# Patient Record
Sex: MALE | NOT HISPANIC OR LATINO
[De-identification: names, ages, dates, MRNs, and addresses within clinical notes are randomized per-mention and may not be internally consistent; named-entity substitution may affect disease eponyms.]

---

## 2017-05-02 ENCOUNTER — APPOINTMENT (OUTPATIENT)
Dept: INTERNAL MEDICINE | Facility: CLINIC | Age: 47
End: 2017-05-02

## 2017-05-02 VITALS
DIASTOLIC BLOOD PRESSURE: 70 MMHG | WEIGHT: 145 LBS | OXYGEN SATURATION: 98 % | HEART RATE: 71 BPM | SYSTOLIC BLOOD PRESSURE: 110 MMHG | TEMPERATURE: 98 F | HEIGHT: 67 IN | BODY MASS INDEX: 22.76 KG/M2

## 2017-05-02 DIAGNOSIS — Z78.9 OTHER SPECIFIED HEALTH STATUS: ICD-10-CM

## 2017-05-02 DIAGNOSIS — Z80.2 FAMILY HISTORY OF MALIGNANT NEOPLASM OF OTHER RESPIRATORY AND INTRATHORACIC ORGANS: ICD-10-CM

## 2017-05-03 LAB
ALBUMIN SERPL ELPH-MCNC: 4.5 G/DL
ALP BLD-CCNC: 56 U/L
ALT SERPL-CCNC: 14 U/L
ANION GAP SERPL CALC-SCNC: 15 MMOL/L
AST SERPL-CCNC: 24 U/L
BASOPHILS # BLD AUTO: 0.04 K/UL
BASOPHILS NFR BLD AUTO: 0.8 %
BILIRUB SERPL-MCNC: 0.8 MG/DL
BUN SERPL-MCNC: 15 MG/DL
CALCIUM SERPL-MCNC: 8.8 MG/DL
CHLORIDE SERPL-SCNC: 102 MMOL/L
CHOLEST SERPL-MCNC: 241 MG/DL
CHOLEST/HDLC SERPL: 4 RATIO
CO2 SERPL-SCNC: 24 MMOL/L
CREAT SERPL-MCNC: 0.92 MG/DL
EOSINOPHIL # BLD AUTO: 0.15 K/UL
EOSINOPHIL NFR BLD AUTO: 2.9 %
GLUCOSE SERPL-MCNC: 92 MG/DL
HCT VFR BLD CALC: 45.3 %
HDLC SERPL-MCNC: 61 MG/DL
HGB BLD-MCNC: 15.4 G/DL
IMM GRANULOCYTES NFR BLD AUTO: 0 %
LDLC SERPL CALC-MCNC: 159 MG/DL
LYMPHOCYTES # BLD AUTO: 1.13 K/UL
LYMPHOCYTES NFR BLD AUTO: 21.6 %
MAN DIFF?: NORMAL
MCHC RBC-ENTMCNC: 28.7 PG
MCHC RBC-ENTMCNC: 34 GM/DL
MCV RBC AUTO: 84.5 FL
MONOCYTES # BLD AUTO: 0.27 K/UL
MONOCYTES NFR BLD AUTO: 5.2 %
NEUTROPHILS # BLD AUTO: 3.65 K/UL
NEUTROPHILS NFR BLD AUTO: 69.5 %
PLATELET # BLD AUTO: 281 K/UL
POTASSIUM SERPL-SCNC: 4.3 MMOL/L
PROT SERPL-MCNC: 7.4 G/DL
RBC # BLD: 5.36 M/UL
RBC # FLD: 13.3 %
SODIUM SERPL-SCNC: 141 MMOL/L
TRIGL SERPL-MCNC: 104 MG/DL
WBC # FLD AUTO: 5.24 K/UL

## 2018-06-04 ENCOUNTER — TRANSCRIPTION ENCOUNTER (OUTPATIENT)
Age: 48
End: 2018-06-04

## 2018-06-05 ENCOUNTER — APPOINTMENT (OUTPATIENT)
Dept: INTERNAL MEDICINE | Facility: CLINIC | Age: 48
End: 2018-06-05
Payer: COMMERCIAL

## 2018-06-05 ENCOUNTER — LABORATORY RESULT (OUTPATIENT)
Age: 48
End: 2018-06-05

## 2018-06-05 VITALS
SYSTOLIC BLOOD PRESSURE: 122 MMHG | WEIGHT: 150 LBS | DIASTOLIC BLOOD PRESSURE: 78 MMHG | BODY MASS INDEX: 23.49 KG/M2 | OXYGEN SATURATION: 100 % | TEMPERATURE: 98.5 F | HEART RATE: 65 BPM

## 2018-06-05 DIAGNOSIS — M77.11 LATERAL EPICONDYLITIS, RIGHT ELBOW: ICD-10-CM

## 2018-06-05 PROCEDURE — 36415 COLL VENOUS BLD VENIPUNCTURE: CPT

## 2018-06-05 PROCEDURE — 99396 PREV VISIT EST AGE 40-64: CPT | Mod: 25,GC

## 2018-07-28 PROBLEM — Z78.9 ALCOHOL USE: Status: ACTIVE | Noted: 2017-05-02

## 2018-08-27 ENCOUNTER — APPOINTMENT (OUTPATIENT)
Dept: INTERNAL MEDICINE | Facility: CLINIC | Age: 48
End: 2018-08-27

## 2018-11-14 ENCOUNTER — APPOINTMENT (OUTPATIENT)
Dept: INTERNAL MEDICINE | Facility: CLINIC | Age: 48
End: 2018-11-14
Payer: COMMERCIAL

## 2018-11-14 VITALS
HEART RATE: 70 BPM | SYSTOLIC BLOOD PRESSURE: 151 MMHG | BODY MASS INDEX: 23.54 KG/M2 | WEIGHT: 150 LBS | DIASTOLIC BLOOD PRESSURE: 84 MMHG | HEIGHT: 67 IN | TEMPERATURE: 98.6 F | OXYGEN SATURATION: 99 %

## 2018-11-14 DIAGNOSIS — K21.9 GASTRO-ESOPHAGEAL REFLUX DISEASE W/OUT ESOPHAGITIS: ICD-10-CM

## 2018-11-14 PROCEDURE — 99214 OFFICE O/P EST MOD 30 MIN: CPT | Mod: GC

## 2018-11-17 ENCOUNTER — OUTPATIENT (OUTPATIENT)
Dept: OUTPATIENT SERVICES | Facility: HOSPITAL | Age: 48
LOS: 1 days | End: 2018-11-17
Payer: COMMERCIAL

## 2018-11-17 PROCEDURE — 73630 X-RAY EXAM OF FOOT: CPT

## 2018-11-17 PROCEDURE — 73630 X-RAY EXAM OF FOOT: CPT | Mod: 26,LT

## 2018-12-11 ENCOUNTER — RX RENEWAL (OUTPATIENT)
Age: 48
End: 2018-12-11

## 2019-05-30 ENCOUNTER — TRANSCRIPTION ENCOUNTER (OUTPATIENT)
Age: 49
End: 2019-05-30

## 2019-06-17 ENCOUNTER — APPOINTMENT (OUTPATIENT)
Dept: INTERNAL MEDICINE | Facility: CLINIC | Age: 49
End: 2019-06-17
Payer: COMMERCIAL

## 2019-06-17 VITALS
OXYGEN SATURATION: 98 % | HEIGHT: 67 IN | HEART RATE: 86 BPM | SYSTOLIC BLOOD PRESSURE: 125 MMHG | DIASTOLIC BLOOD PRESSURE: 70 MMHG | BODY MASS INDEX: 23.54 KG/M2 | WEIGHT: 150 LBS | TEMPERATURE: 98.9 F

## 2019-06-17 PROCEDURE — 99214 OFFICE O/P EST MOD 30 MIN: CPT | Mod: GC

## 2019-06-17 RX ORDER — NAPROXEN 500 MG/1
500 TABLET ORAL
Qty: 10 | Refills: 0 | Status: DISCONTINUED | COMMUNITY
Start: 2018-11-14 | End: 2019-06-17

## 2019-06-17 RX ORDER — MOMETASONE FUROATE 1 MG/G
0.1 CREAM TOPICAL TWICE DAILY
Qty: 1 | Refills: 1 | Status: DISCONTINUED | COMMUNITY
Start: 2017-05-02 | End: 2019-06-17

## 2019-06-17 NOTE — END OF VISIT
[] : Resident [FreeTextEntry3] :  49 year old M presenting with dizziness. Reports an episode of significant disequilibrium/dizziness less than a week ago while playing tennis. Recently had started finasteride for alopecia, purchased online. Pt notes that he had drank alcohol prior to his tennis match and that it was a hot day. Symptoms lasted ~15min and resolved spontaneously, no recent CP, palpitations or focal weakness/numbness. He has had several brief, mild episodes of dizziness since that episode, has discontinued finasteride. He has been able to exert himself normally, in fact played another tennis match and did not experience dizziness. Has a distant h/o dizziness treated empirically w/ antiemetics in the past. On exam, well appearing, well developed male NAD. Suspect medication side effect (finasteride causes orthostatic hypotension in up to 10% of patients, likely exacerbated by dehydration in the setting of EtOH intake, hot weather). Pt will return in 1-2 weeks to reassess symptoms, if not resolved at that time, will pursue further workup.

## 2019-06-17 NOTE — PHYSICAL EXAM
[No Acute Distress] : no acute distress [Well Nourished] : well nourished [Well-Appearing] : well-appearing [Normal Sclera/Conjunctiva] : normal sclera/conjunctiva [Well Developed] : well developed [PERRL] : pupils equal round and reactive to light [EOMI] : extraocular movements intact [Normal Oropharynx] : the oropharynx was normal [Normal Outer Ear/Nose] : the outer ears and nose were normal in appearance [No JVD] : no jugular venous distention [No Lymphadenopathy] : no lymphadenopathy [Supple] : supple [Thyroid Normal, No Nodules] : the thyroid was normal and there were no nodules present [No Respiratory Distress] : no respiratory distress  [Clear to Auscultation] : lungs were clear to auscultation bilaterally [No Accessory Muscle Use] : no accessory muscle use [Normal Rate] : normal rate  [Normal S1, S2] : normal S1 and S2 [Regular Rhythm] : with a regular rhythm [No Murmur] : no murmur heard [No Carotid Bruits] : no carotid bruits [No Abdominal Bruit] : a ~M bruit was not heard ~T in the abdomen [No Varicosities] : no varicosities [Pedal Pulses Present] : the pedal pulses are present [No Extremity Clubbing/Cyanosis] : no extremity clubbing/cyanosis [No Edema] : there was no peripheral edema [Soft] : abdomen soft [No Palpable Aorta] : no palpable aorta [Non Tender] : non-tender [Non-distended] : non-distended [No Masses] : no abdominal mass palpated [No HSM] : no HSM [Normal Bowel Sounds] : normal bowel sounds [Normal Posterior Cervical Nodes] : no posterior cervical lymphadenopathy [No CVA Tenderness] : no CVA  tenderness [Normal Anterior Cervical Nodes] : no anterior cervical lymphadenopathy [No Joint Swelling] : no joint swelling [No Spinal Tenderness] : no spinal tenderness [Grossly Normal Strength/Tone] : grossly normal strength/tone [No Rash] : no rash [Coordination Grossly Intact] : coordination grossly intact [Normal Gait] : normal gait [Normal Affect] : the affect was normal [Deep Tendon Reflexes (DTR)] : deep tendon reflexes were 2+ and symmetric [No Focal Deficits] : no focal deficits [Normal Insight/Judgement] : insight and judgment were intact [de-identified] : Excess cerumen in ear canals b/l

## 2019-06-17 NOTE — ASSESSMENT
[FreeTextEntry1] : 48 y/o M PMHx GERD, alopecia, Hx of vertigo presenting to [facility] with complaints of dizziness approximately 1 week ago.\par \par #Dizziness\par Patient notes persistent lightheadedness for the last week, 5 days prior to start of symptoms patient started himself on Finasteride for alopecia which he discontinued the day his symptoms started. Neuro and cardiac exam WNL. No known prior cardiac history however endorses FMHx of MI in brother (age 55) and father (age 65). Also with prior history of vertigo. Has been attempting modified Epley maneuver at home with some improvement in severity of sxs.  DDx includes medication induced orthostatic hypotension from Finasteride (reported in <10% of cases) vs vertigo vs dehydration. Cardiac and neurological etiology lower on ddx based on physical exam and history.\par -Encouraged to continue to do modified Epley maneuver at home\par -Encouraged adequate hydration especially with exercise\par -If sxs persistent, will consider further workup\par -Recommended that he return to the clinic in 1-2 weeks to re-assess his symptoms \par \par #Back \par Patient states that approximately 4 weeks ago, he experienced some intermittent R lower back pain exacerbated by certain movements. Straight leg raise test negative on exam. Likely muscle spasms\par -Patient active, encouraged adequate stretching with exercise\par -Advised that patient may take Ibuprofen PRN with food for pain \par \par #Hypercholesterolemia\par Patient with elevated total cholesterol, not on statin therapy, low ASCVD score\par -Would like to defer statin therapy and continue with lifestyle modifications\par -Will re-check lipid profile at next visit in 1-2 weeks\par \par #Excessive cerumen\par Excess cerumen in external ear canal b/l\par -Rx for Debrox sent to pharmacy\par \par #Health Care Maintenance\par -HIV - deferred \par -Tdap - 5/2017\par \par RTC in 1-2 weeks to re-assess dizziness

## 2019-06-17 NOTE — HISTORY OF PRESENT ILLNESS
[FreeTextEntry1] : dizzy [de-identified] : 50 y/o M PMHx GERD, alopecia, Hx of vertigo presenting to [facility] with complaints of dizziness approximately 1 week ago. Patient states that during his 2 hr tennis match, he felt very lightheaded. He states that the day he was playing was hot (in the 80s) however he states that he was used to playing for that long and in similar weather. Patient notes that earlier in the day he had a full lunch and had two alcoholic beverages during that time. He wasn't sure if that may have contributed to his symptoms later on that day. He describes the dizziness as having the sensation of feeling unsteady and notes leaning to his R side, lasted approximately 15 minutes. He notes similar, less severe episodes daily after the first episode. Of note, patient notes that approximately 2 weeks ago, he started Finasteride for his alopecia, which he states obtaining through an online service. Since these dizzy spells, he discontinued this medication. In addition, patient notes R lower back pain for the last 3-4 weeks which is exacerbated with certain movements. Believes he may have sciatica. Otherwise patient denies any fevers, chills, NS, associated room spinning, chest pain, palpitations, shortness of breath, nausea/vomiting, numbness, weakness. Remainder of ROS negative.

## 2019-06-23 ENCOUNTER — RESULT CHARGE (OUTPATIENT)
Age: 49
End: 2019-06-23

## 2019-06-24 ENCOUNTER — APPOINTMENT (OUTPATIENT)
Dept: INTERNAL MEDICINE | Facility: CLINIC | Age: 49
End: 2019-06-24
Payer: COMMERCIAL

## 2019-06-24 VITALS — OXYGEN SATURATION: 100 % | DIASTOLIC BLOOD PRESSURE: 77 MMHG | SYSTOLIC BLOOD PRESSURE: 114 MMHG | HEART RATE: 63 BPM

## 2019-06-24 DIAGNOSIS — R07.89 OTHER CHEST PAIN: ICD-10-CM

## 2019-06-24 DIAGNOSIS — H61.23 IMPACTED CERUMEN, BILATERAL: ICD-10-CM

## 2019-06-24 PROCEDURE — 36415 COLL VENOUS BLD VENIPUNCTURE: CPT

## 2019-06-24 PROCEDURE — 99214 OFFICE O/P EST MOD 30 MIN: CPT | Mod: 25,GC

## 2019-06-24 PROCEDURE — 93000 ELECTROCARDIOGRAM COMPLETE: CPT

## 2019-06-25 LAB
ANION GAP SERPL CALC-SCNC: 13 MMOL/L
BASOPHILS # BLD AUTO: 0.05 K/UL
BASOPHILS NFR BLD AUTO: 0.8 %
BUN SERPL-MCNC: 14 MG/DL
CALCIUM SERPL-MCNC: 9.2 MG/DL
CHLORIDE SERPL-SCNC: 105 MMOL/L
CO2 SERPL-SCNC: 25 MMOL/L
CREAT SERPL-MCNC: 1.02 MG/DL
EOSINOPHIL # BLD AUTO: 0.07 K/UL
EOSINOPHIL NFR BLD AUTO: 1.1 %
GLUCOSE SERPL-MCNC: 83 MG/DL
HCT VFR BLD CALC: 48.7 %
HGB BLD-MCNC: 15.3 G/DL
IMM GRANULOCYTES NFR BLD AUTO: 0.3 %
LYMPHOCYTES # BLD AUTO: 1.38 K/UL
LYMPHOCYTES NFR BLD AUTO: 22.4 %
MAN DIFF?: NORMAL
MCHC RBC-ENTMCNC: 29 PG
MCHC RBC-ENTMCNC: 31.4 GM/DL
MCV RBC AUTO: 92.4 FL
MONOCYTES # BLD AUTO: 0.43 K/UL
MONOCYTES NFR BLD AUTO: 7 %
NEUTROPHILS # BLD AUTO: 4.22 K/UL
NEUTROPHILS NFR BLD AUTO: 68.4 %
PLATELET # BLD AUTO: 312 K/UL
POTASSIUM SERPL-SCNC: 4.4 MMOL/L
RBC # BLD: 5.27 M/UL
RBC # FLD: 14.4 %
SODIUM SERPL-SCNC: 143 MMOL/L
TSH SERPL-ACNC: 1.28 UIU/ML
WBC # FLD AUTO: 6.17 K/UL

## 2019-06-25 NOTE — HISTORY OF PRESENT ILLNESS
[FreeTextEntry1] : f/u vertigo [de-identified] : 50 y/o M PMHx GERD, alopecia, Hx of vertigo presenting to [facility] for follow up of dizziness from the week prior. Patient notes that he continues to experience some dizziness precipitated by positional changes however notably less than last week when he first presented to [facility]. Patient notes that he has been able to continue to participate in his tennis matches in the interim with no difficulties. He notes that he has not been doing the modified Epley maneuver at home which he reports helped him before. He also notes he has made sure to stay hydrated during this time as well. In addition, he notes that since his first episode of dizziness more than 2 weeks ago, he has noted constant mid-sternal 1-2/10 chest discomfort with associated belching. He notes that with belching, his chest discomfort is relieved.  He has tried OTC gas-x which has provided some relief. Otherwise, patient denies any fevers, chills, NS, shortness of breath, cough, nausea/vomiting, changes in urinary or bowel habits. Remainder of ROS negative.

## 2019-06-25 NOTE — PHYSICAL EXAM
[No Acute Distress] : no acute distress [Well Nourished] : well nourished [Well Developed] : well developed [Well-Appearing] : well-appearing [Normal Sclera/Conjunctiva] : normal sclera/conjunctiva [PERRL] : pupils equal round and reactive to light [EOMI] : extraocular movements intact [Normal Outer Ear/Nose] : the outer ears and nose were normal in appearance [Normal Oropharynx] : the oropharynx was normal [No JVD] : no jugular venous distention [Supple] : supple [Thyroid Normal, No Nodules] : the thyroid was normal and there were no nodules present [No Lymphadenopathy] : no lymphadenopathy [No Respiratory Distress] : no respiratory distress  [Clear to Auscultation] : lungs were clear to auscultation bilaterally [Normal Rate] : normal rate  [No Accessory Muscle Use] : no accessory muscle use [Regular Rhythm] : with a regular rhythm [No Murmur] : no murmur heard [Normal S1, S2] : normal S1 and S2 [No Carotid Bruits] : no carotid bruits [No Abdominal Bruit] : a ~M bruit was not heard ~T in the abdomen [No Varicosities] : no varicosities [Pedal Pulses Present] : the pedal pulses are present [No Edema] : there was no peripheral edema [No Extremity Clubbing/Cyanosis] : no extremity clubbing/cyanosis [No Palpable Aorta] : no palpable aorta [Non Tender] : non-tender [Soft] : abdomen soft [Non-distended] : non-distended [No Masses] : no abdominal mass palpated [No HSM] : no HSM [Normal Bowel Sounds] : normal bowel sounds [Normal Posterior Cervical Nodes] : no posterior cervical lymphadenopathy [No CVA Tenderness] : no CVA  tenderness [Normal Anterior Cervical Nodes] : no anterior cervical lymphadenopathy [No Spinal Tenderness] : no spinal tenderness [No Joint Swelling] : no joint swelling [Grossly Normal Strength/Tone] : grossly normal strength/tone [No Rash] : no rash [Normal Gait] : normal gait [Coordination Grossly Intact] : coordination grossly intact [No Focal Deficits] : no focal deficits [Deep Tendon Reflexes (DTR)] : deep tendon reflexes were 2+ and symmetric [Normal Affect] : the affect was normal [Normal Insight/Judgement] : insight and judgment were intact [de-identified] : no nystagmus on [place]-Hallpike maneuver

## 2019-06-25 NOTE — ASSESSMENT
[FreeTextEntry1] : 48 y/o M PMHx GERD, alopecia, Hx of vertigo presenting to [facility] for follow up of dizziness from the week prior.\par \par #Chest discomfort\par Patient noting 1-2/10 constant chest discomfort with occasional shortness of breath however patient believes this may anxiety provoked. Overall, low risk factors for cardiac disease, unlikely cardiac in etiology\par -EKG in office with no ischemic changes including TWIs or ST segment elevations/depressions\par \par #Dizziness\par Patient with recent episodes of dizziness, associated with positional changes, likely BPPV as per history and exam though [place]-Hallpike maneuver negative during encounter today\par -Encouraged that patient continue to practice the modified Epley maneuver at home to help with his symptoms\par -If persistent, may consider ENT referral/Vestibular rehab\par \par #Excessive cerumen \par Patient has not used prescribed gtt since last visit, reports that he will start using the gtts \par \par RTC PRN

## 2019-06-25 NOTE — END OF VISIT
[] : Resident [FreeTextEntry3] :  49 year old M presenting for f/u of vertigo. Reports intermittent, brief episodes of vertigo (room seems to shift around him when he moves his head), but less frequent and less severe than prior. Denies tinnitus. He has also had a constant, mild epigastric discomfort improved w/ belching, rare palpitations. Reports good continued exercise capacity, plays tennis w/o worsening in chest symptoms or vertigo. Has not performed modified Epley maneuver at home as we previously recommended. On exam, well appearing male NAD, RRR, no MRG, lungs CTAB. [place] hallpike maneuver negative for nystagmus, but did provoke transient vertigo symptoms. Brief self-limited episodes of vertigo triggered by turning the head w/o associated neuro symptoms c/w benign peripheral etiology, likely BPPV. EKG today reassuring, will check routine labs. Reviewed modified Epley maneuver for him to perform at home, RTC if persists or worsens, would consider ENT referral if needed.

## 2019-09-10 ENCOUNTER — FORM ENCOUNTER (OUTPATIENT)
Age: 49
End: 2019-09-10

## 2019-09-10 ENCOUNTER — APPOINTMENT (OUTPATIENT)
Dept: INTERNAL MEDICINE | Facility: CLINIC | Age: 49
End: 2019-09-10
Payer: COMMERCIAL

## 2019-09-10 VITALS
HEART RATE: 77 BPM | HEIGHT: 67 IN | DIASTOLIC BLOOD PRESSURE: 80 MMHG | SYSTOLIC BLOOD PRESSURE: 136 MMHG | TEMPERATURE: 97.5 F | OXYGEN SATURATION: 99 % | WEIGHT: 153 LBS | BODY MASS INDEX: 24.01 KG/M2

## 2019-09-10 DIAGNOSIS — M54.9 DORSALGIA, UNSPECIFIED: ICD-10-CM

## 2019-09-10 PROCEDURE — 99214 OFFICE O/P EST MOD 30 MIN: CPT | Mod: 25,GC

## 2019-09-10 PROCEDURE — 36415 COLL VENOUS BLD VENIPUNCTURE: CPT

## 2019-09-11 ENCOUNTER — APPOINTMENT (OUTPATIENT)
Dept: ULTRASOUND IMAGING | Facility: HOSPITAL | Age: 49
End: 2019-09-11

## 2019-09-11 ENCOUNTER — OUTPATIENT (OUTPATIENT)
Dept: OUTPATIENT SERVICES | Facility: HOSPITAL | Age: 49
LOS: 1 days | End: 2019-09-11
Payer: COMMERCIAL

## 2019-09-11 PROCEDURE — 76870 US EXAM SCROTUM: CPT

## 2019-09-11 PROCEDURE — 72110 X-RAY EXAM L-2 SPINE 4/>VWS: CPT

## 2019-09-11 PROCEDURE — 72110 X-RAY EXAM L-2 SPINE 4/>VWS: CPT | Mod: 26

## 2019-09-11 PROCEDURE — 76870 US EXAM SCROTUM: CPT | Mod: 26

## 2019-09-12 LAB
ALBUMIN SERPL ELPH-MCNC: 4.9 G/DL
ALP BLD-CCNC: 61 U/L
ALT SERPL-CCNC: 20 U/L
ANION GAP SERPL CALC-SCNC: 16 MMOL/L
APPEARANCE: CLEAR
AST SERPL-CCNC: 29 U/L
BACTERIA: NEGATIVE
BASOPHILS # BLD AUTO: 0.09 K/UL
BASOPHILS NFR BLD AUTO: 1 %
BILIRUB SERPL-MCNC: 0.6 MG/DL
BILIRUBIN URINE: NEGATIVE
BLOOD URINE: NEGATIVE
BUN SERPL-MCNC: 13 MG/DL
C TRACH RRNA SPEC QL NAA+PROBE: NOT DETECTED
CALCIUM SERPL-MCNC: 9.5 MG/DL
CHLORIDE SERPL-SCNC: 103 MMOL/L
CO2 SERPL-SCNC: 24 MMOL/L
COLOR: NORMAL
CREAT SERPL-MCNC: 1.01 MG/DL
EOSINOPHIL # BLD AUTO: 0.19 K/UL
EOSINOPHIL NFR BLD AUTO: 2.2 %
GLUCOSE QUALITATIVE U: NEGATIVE
GLUCOSE SERPL-MCNC: 98 MG/DL
HCT VFR BLD CALC: 47.5 %
HGB BLD-MCNC: 15.4 G/DL
HYALINE CASTS: 0 /LPF
IMM GRANULOCYTES NFR BLD AUTO: 0.5 %
KETONES URINE: NEGATIVE
LEUKOCYTE ESTERASE URINE: NEGATIVE
LYMPHOCYTES # BLD AUTO: 1.4 K/UL
LYMPHOCYTES NFR BLD AUTO: 15.9 %
MAN DIFF?: NORMAL
MCHC RBC-ENTMCNC: 28.7 PG
MCHC RBC-ENTMCNC: 32.4 GM/DL
MCV RBC AUTO: 88.5 FL
MICROSCOPIC-UA: NORMAL
MONOCYTES # BLD AUTO: 0.51 K/UL
MONOCYTES NFR BLD AUTO: 5.8 %
N GONORRHOEA RRNA SPEC QL NAA+PROBE: NOT DETECTED
NEUTROPHILS # BLD AUTO: 6.58 K/UL
NEUTROPHILS NFR BLD AUTO: 74.6 %
NITRITE URINE: NEGATIVE
PH URINE: 6.5
PLATELET # BLD AUTO: 284 K/UL
POTASSIUM SERPL-SCNC: 4.8 MMOL/L
PROT SERPL-MCNC: 7.5 G/DL
PROTEIN URINE: NEGATIVE
PSA FREE FLD-MCNC: 48 %
PSA FREE SERPL-MCNC: 0.5 NG/ML
PSA SERPL-MCNC: 1.06 NG/ML
RBC # BLD: 5.37 M/UL
RBC # FLD: 13.2 %
RED BLOOD CELLS URINE: 1 /HPF
SODIUM SERPL-SCNC: 143 MMOL/L
SOURCE AMPLIFICATION: NORMAL
SPECIFIC GRAVITY URINE: 1.01
SQUAMOUS EPITHELIAL CELLS: 0 /HPF
T PALLIDUM AB SER QL IA: NEGATIVE
UROBILINOGEN URINE: NORMAL
WBC # FLD AUTO: 8.81 K/UL
WHITE BLOOD CELLS URINE: 0 /HPF

## 2019-09-13 LAB — BACTERIA UR CULT: NORMAL

## 2019-09-27 ENCOUNTER — OUTPATIENT (OUTPATIENT)
Dept: OUTPATIENT SERVICES | Facility: HOSPITAL | Age: 49
LOS: 1 days | End: 2019-09-27

## 2019-09-27 ENCOUNTER — APPOINTMENT (OUTPATIENT)
Dept: UROLOGY | Facility: CLINIC | Age: 49
End: 2019-09-27

## 2019-09-27 VITALS
SYSTOLIC BLOOD PRESSURE: 113 MMHG | DIASTOLIC BLOOD PRESSURE: 78 MMHG | RESPIRATION RATE: 14 BRPM | HEART RATE: 100 BPM | TEMPERATURE: 99 F

## 2019-09-27 DIAGNOSIS — R39.9 UNSPECIFIED SYMPTOMS AND SIGNS INVOLVING THE GENITOURINARY SYSTEM: ICD-10-CM

## 2019-09-27 NOTE — ASSESSMENT
[Urinary Symptom or Sign (788.99\R39.89)] : implantation [FreeTextEntry1] : 50 yo M with hematospermia\par -UA/cx negative\par -STD panel negative\par -testicular US normal\par -f/u 3-6 months

## 2019-09-27 NOTE — HISTORY OF PRESENT ILLNESS
[FreeTextEntry1] : 48yo M otherwise healthy with hematospermia. Saw blood in semen approximately 6 months ago once. Appeared again several weeks ago one time. No hematuria. No history of STDs. Saw PCP who ordered STD panel, Ua/cx, PSA, all normal. Pt was having right sided testicular discomfort, had testicular US done which demonstrated possible R epididymitis for which he was prescribed a course of bactrim which resolved his pain. Occasionally has b/l groin pain. Never smoker. No urinary complaints, no LUTS. No family h/o  malignancies.  [Urinary Retention] : no urinary retention [Urinary Incontinence] : no urinary incontinence [Urinary Urgency] : no urinary urgency [Urinary Frequency] : no urinary frequency [Straining] : no straining [Nocturia] : no nocturia [Weak Stream] : no weak stream [Dysuria] : no dysuria [Hematuria - Gross] : no gross hematuria [Abdominal Pain] : no abdominal pain [Flank Pain] : no flank pain [Fever] : no fever [Anorexia] : no anorexia [Nausea] : no nausea

## 2019-09-27 NOTE — PHYSICAL EXAM
[General Appearance - Well Nourished] : well nourished [General Appearance - Well Developed] : well developed [Heart Rate And Rhythm] : Heart rate and rhythm were normal [Normal Appearance] : normal appearance [] : no respiratory distress [Exaggerated Use Of Accessory Muscles For Inspiration] : no accessory muscle use [Abdomen Tenderness] : non-tender [Abdomen Soft] : soft [Urethral Meatus] : meatus normal [Scrotum] : the scrotum was normal [Penis Abnormality] : normal circumcised penis [Epididymis] : the epididymides were normal [Testes Tenderness] : no tenderness of the testes [Testes Mass (___cm)] : there were no testicular masses [Normal Station and Gait] : the gait and station were normal for the patient's age [Skin Color & Pigmentation] : normal skin color and pigmentation [Skin Turgor] : supple [Oriented To Time, Place, And Person] : oriented to person, place, and time [No Focal Deficits] : no focal deficits [Not Anxious] : not anxious [No Palpable Adenopathy] : no palpable adenopathy

## 2019-10-02 DIAGNOSIS — N50.9 DISORDER OF MALE GENITAL ORGANS, UNSPECIFIED: ICD-10-CM

## 2019-11-04 ENCOUNTER — APPOINTMENT (OUTPATIENT)
Dept: INTERNAL MEDICINE | Facility: CLINIC | Age: 49
End: 2019-11-04
Payer: COMMERCIAL

## 2019-11-04 VITALS
DIASTOLIC BLOOD PRESSURE: 74 MMHG | BODY MASS INDEX: 23.54 KG/M2 | TEMPERATURE: 98.3 F | OXYGEN SATURATION: 99 % | HEIGHT: 67 IN | WEIGHT: 150 LBS | SYSTOLIC BLOOD PRESSURE: 125 MMHG | HEART RATE: 65 BPM

## 2019-11-04 DIAGNOSIS — R42 DIZZINESS AND GIDDINESS: ICD-10-CM

## 2019-11-04 DIAGNOSIS — Z00.00 ENCOUNTER FOR GENERAL ADULT MEDICAL EXAMINATION W/OUT ABNORMAL FINDINGS: ICD-10-CM

## 2019-11-04 DIAGNOSIS — Z28.21 IMMUNIZATION NOT CARRIED OUT BECAUSE OF PATIENT REFUSAL: ICD-10-CM

## 2019-11-04 DIAGNOSIS — R10.30 LOWER ABDOMINAL PAIN, UNSPECIFIED: ICD-10-CM

## 2019-11-04 PROCEDURE — 99214 OFFICE O/P EST MOD 30 MIN: CPT | Mod: GC

## 2019-11-04 RX ORDER — SULFAMETHOXAZOLE AND TRIMETHOPRIM 800; 160 MG/1; MG/1
800-160 TABLET ORAL TWICE DAILY
Qty: 20 | Refills: 0 | Status: COMPLETED | COMMUNITY
Start: 2019-09-12 | End: 2019-11-04

## 2019-11-27 PROBLEM — Z28.21 REFUSED INFLUENZA VACCINE: Status: ACTIVE | Noted: 2019-11-04

## 2020-02-06 ENCOUNTER — APPOINTMENT (OUTPATIENT)
Dept: INTERNAL MEDICINE | Facility: CLINIC | Age: 50
End: 2020-02-06
Payer: COMMERCIAL

## 2020-02-06 VITALS
HEART RATE: 87 BPM | WEIGHT: 154 LBS | BODY MASS INDEX: 24.12 KG/M2 | DIASTOLIC BLOOD PRESSURE: 81 MMHG | OXYGEN SATURATION: 99 % | SYSTOLIC BLOOD PRESSURE: 122 MMHG

## 2020-02-06 PROCEDURE — 36415 COLL VENOUS BLD VENIPUNCTURE: CPT

## 2020-02-06 PROCEDURE — 99214 OFFICE O/P EST MOD 30 MIN: CPT | Mod: 25,GC

## 2020-02-12 LAB
APPEARANCE: CLEAR
BACTERIA UR CULT: NORMAL
BACTERIA: NEGATIVE
BASOPHILS # BLD AUTO: 0.07 K/UL
BASOPHILS NFR BLD AUTO: 1.1 %
BILIRUBIN URINE: NEGATIVE
BLOOD URINE: NEGATIVE
COLOR: COLORLESS
EOSINOPHIL # BLD AUTO: 0.1 K/UL
EOSINOPHIL NFR BLD AUTO: 1.6 %
GLUCOSE QUALITATIVE U: NEGATIVE
HCT VFR BLD CALC: 49.3 %
HGB BLD-MCNC: 16.1 G/DL
HYALINE CASTS: 0 /LPF
IMM GRANULOCYTES NFR BLD AUTO: 0.5 %
KETONES URINE: NEGATIVE
LEUKOCYTE ESTERASE URINE: NEGATIVE
LYMPHOCYTES # BLD AUTO: 1.37 K/UL
LYMPHOCYTES NFR BLD AUTO: 21.3 %
MAN DIFF?: NORMAL
MCHC RBC-ENTMCNC: 29.1 PG
MCHC RBC-ENTMCNC: 32.7 GM/DL
MCV RBC AUTO: 89 FL
MICROSCOPIC-UA: NORMAL
MONOCYTES # BLD AUTO: 0.39 K/UL
MONOCYTES NFR BLD AUTO: 6.1 %
NEUTROPHILS # BLD AUTO: 4.46 K/UL
NEUTROPHILS NFR BLD AUTO: 69.4 %
NITRITE URINE: NEGATIVE
PH URINE: 7
PLATELET # BLD AUTO: 340 K/UL
PROTEIN URINE: NEGATIVE
PSA SERPL-MCNC: 0.8 NG/ML
RBC # BLD: 5.54 M/UL
RBC # FLD: 13.4 %
RED BLOOD CELLS URINE: 0 /HPF
SPECIFIC GRAVITY URINE: 1
SQUAMOUS EPITHELIAL CELLS: 0 /HPF
UROBILINOGEN URINE: NORMAL
WBC # FLD AUTO: 6.42 K/UL
WHITE BLOOD CELLS URINE: 0 /HPF

## 2020-02-21 ENCOUNTER — OUTPATIENT (OUTPATIENT)
Dept: OUTPATIENT SERVICES | Facility: HOSPITAL | Age: 50
LOS: 1 days | End: 2020-02-21

## 2020-02-21 ENCOUNTER — APPOINTMENT (OUTPATIENT)
Dept: UROLOGY | Facility: CLINIC | Age: 50
End: 2020-02-21

## 2020-02-21 VITALS — TEMPERATURE: 98.8 F | RESPIRATION RATE: 14 BRPM | SYSTOLIC BLOOD PRESSURE: 146 MMHG | DIASTOLIC BLOOD PRESSURE: 80 MMHG

## 2020-02-21 DIAGNOSIS — R10.2 PELVIC AND PERINEAL PAIN: ICD-10-CM

## 2020-02-21 DIAGNOSIS — R36.1 HEMATOSPERMIA: ICD-10-CM

## 2020-02-21 NOTE — PHYSICAL EXAM
[Normal Appearance] : normal appearance [General Appearance - Well Developed] : well developed [General Appearance - Well Nourished] : well nourished [Well Groomed] : well groomed [General Appearance - In No Acute Distress] : no acute distress [Abdomen Soft] : soft [Urethral Meatus] : meatus normal [Costovertebral Angle Tenderness] : no ~M costovertebral angle tenderness [Abdomen Tenderness] : non-tender [Penis Abnormality] : normal circumcised penis [Epididymis] : the epididymides were normal [Testes Tenderness] : no tenderness of the testes [Scrotum] : the scrotum was normal [Prostate Tenderness] : the prostate was not tender [Prostate Size ___ gm] : prostate size [unfilled] gm

## 2020-02-21 NOTE — ASSESSMENT
[FreeTextEntry1] : Hematospermia, pelvic pain - suspect benign but given persistence will attempt to obtain MRI Pelvic to further evaluate.\par -MRI Pelvis - will obtain CT A/P if not approved\par -f/up to review

## 2020-02-21 NOTE — HISTORY OF PRESENT ILLNESS
[FreeTextEntry1] : 48yo M otherwise healthy with hematospermia. Saw blood in semen approximately 6 months ago once. Appeared again several weeks ago one time. No hematuria. No history of STDs. Saw PCP who ordered STD panel, Ua/cx, PSA, all normal. Pt was having right sided testicular discomfort, had testicular US done which demonstrated possible R epididymitis for which he was prescribed a course of bactrim which resolved his pain. Occasionally has b/l groin pain. Never smoker. No urinary complaints, no LUTS. No family h/o  malignancies. \par \par 2/21/20: Here for f/up. Has continued to experience hematospermia. Distressed about this. Decreased sexual activity, exercise. Also endorses nonspecific achy pelvic pain, groin pain, flank pain. No hematuria, dysuria, UTI. \par PSA @ pcp was 0.8, UA negative, Hgb stable

## 2020-02-24 DIAGNOSIS — R10.2 PELVIC AND PERINEAL PAIN: ICD-10-CM

## 2020-02-24 DIAGNOSIS — R36.1 HEMATOSPERMIA: ICD-10-CM

## 2020-03-08 ENCOUNTER — FORM ENCOUNTER (OUTPATIENT)
Age: 50
End: 2020-03-08

## 2020-03-09 ENCOUNTER — OUTPATIENT (OUTPATIENT)
Dept: OUTPATIENT SERVICES | Facility: HOSPITAL | Age: 50
LOS: 1 days | End: 2020-03-09
Payer: COMMERCIAL

## 2020-03-09 ENCOUNTER — APPOINTMENT (OUTPATIENT)
Dept: MRI IMAGING | Facility: HOSPITAL | Age: 50
End: 2020-03-09
Payer: COMMERCIAL

## 2020-03-09 PROCEDURE — 72197 MRI PELVIS W/O & W/DYE: CPT

## 2020-03-09 PROCEDURE — 72197 MRI PELVIS W/O & W/DYE: CPT | Mod: 26

## 2020-03-09 PROCEDURE — A9585: CPT

## 2021-07-07 ENCOUNTER — TRANSCRIPTION ENCOUNTER (OUTPATIENT)
Age: 51
End: 2021-07-07

## 2021-09-16 ENCOUNTER — NON-APPOINTMENT (OUTPATIENT)
Age: 51
End: 2021-09-16

## 2021-09-16 DIAGNOSIS — Z76.0 ENCOUNTER FOR ISSUE OF REPEAT PRESCRIPTION: ICD-10-CM

## 2021-09-17 ENCOUNTER — APPOINTMENT (OUTPATIENT)
Dept: INTERNAL MEDICINE | Facility: CLINIC | Age: 51
End: 2021-09-17
Payer: COMMERCIAL

## 2021-09-17 VITALS
DIASTOLIC BLOOD PRESSURE: 72 MMHG | SYSTOLIC BLOOD PRESSURE: 126 MMHG | RESPIRATION RATE: 16 BRPM | HEART RATE: 68 BPM | TEMPERATURE: 98.5 F | OXYGEN SATURATION: 100 %

## 2021-09-17 DIAGNOSIS — Z79.1 LONG TERM (CURRENT) USE OF NON-STEROIDAL ANTI-INFLAMMATORIES (NSAID): ICD-10-CM

## 2021-09-17 PROCEDURE — 99213 OFFICE O/P EST LOW 20 MIN: CPT | Mod: 25

## 2021-09-17 RX ORDER — FAMOTIDINE 20 MG/1
20 TABLET, FILM COATED ORAL DAILY
Qty: 30 | Refills: 0 | Status: COMPLETED | COMMUNITY
Start: 2018-11-14 | End: 2021-09-17

## 2021-09-17 RX ORDER — NAPROXEN 500 MG/1
500 TABLET ORAL
Qty: 28 | Refills: 0 | Status: ACTIVE | COMMUNITY
Start: 2021-09-17 | End: 1900-01-01

## 2021-09-17 RX ORDER — ASPIRIN 81 MG
6.5 TABLET, DELAYED RELEASE (ENTERIC COATED) ORAL
Qty: 1 | Refills: 0 | Status: COMPLETED | COMMUNITY
Start: 2019-06-17 | End: 2021-09-17

## 2021-09-20 ENCOUNTER — TRANSCRIPTION ENCOUNTER (OUTPATIENT)
Age: 51
End: 2021-09-20

## 2021-09-20 LAB
ALBUMIN SERPL ELPH-MCNC: 4.6 G/DL
ALP BLD-CCNC: 61 U/L
ALT SERPL-CCNC: 23 U/L
ANION GAP SERPL CALC-SCNC: 11 MMOL/L
AST SERPL-CCNC: 28 U/L
BILIRUB SERPL-MCNC: 0.4 MG/DL
BUN SERPL-MCNC: 14 MG/DL
CALCIUM SERPL-MCNC: 9.4 MG/DL
CHLORIDE SERPL-SCNC: 106 MMOL/L
CO2 SERPL-SCNC: 28 MMOL/L
CREAT SERPL-MCNC: 0.95 MG/DL
GLUCOSE SERPL-MCNC: 88 MG/DL
POTASSIUM SERPL-SCNC: 4.7 MMOL/L
PROT SERPL-MCNC: 7 G/DL
SODIUM SERPL-SCNC: 144 MMOL/L

## 2021-09-21 ENCOUNTER — APPOINTMENT (OUTPATIENT)
Dept: INTERNAL MEDICINE | Facility: CLINIC | Age: 51
End: 2021-09-21

## 2021-09-23 ENCOUNTER — APPOINTMENT (OUTPATIENT)
Dept: INTERNAL MEDICINE | Facility: CLINIC | Age: 51
End: 2021-09-23

## 2021-10-12 ENCOUNTER — APPOINTMENT (OUTPATIENT)
Dept: INTERNAL MEDICINE | Facility: CLINIC | Age: 51
End: 2021-10-12

## 2021-11-02 ENCOUNTER — APPOINTMENT (OUTPATIENT)
Dept: INTERNAL MEDICINE | Facility: CLINIC | Age: 51
End: 2021-11-02

## 2021-11-02 ENCOUNTER — APPOINTMENT (OUTPATIENT)
Dept: INTERNAL MEDICINE | Facility: CLINIC | Age: 51
End: 2021-11-02
Payer: COMMERCIAL

## 2021-11-02 VITALS
SYSTOLIC BLOOD PRESSURE: 119 MMHG | OXYGEN SATURATION: 99 % | TEMPERATURE: 98 F | DIASTOLIC BLOOD PRESSURE: 77 MMHG | HEART RATE: 77 BPM

## 2021-11-02 DIAGNOSIS — Z00.00 ENCOUNTER FOR GENERAL ADULT MEDICAL EXAMINATION W/OUT ABNORMAL FINDINGS: ICD-10-CM

## 2021-11-02 DIAGNOSIS — E78.00 PURE HYPERCHOLESTEROLEMIA, UNSPECIFIED: ICD-10-CM

## 2021-11-02 DIAGNOSIS — N45.1 EPIDIDYMITIS: ICD-10-CM

## 2021-11-02 DIAGNOSIS — M72.2 PLANTAR FASCIAL FIBROMATOSIS: ICD-10-CM

## 2021-11-02 DIAGNOSIS — Z23 ENCOUNTER FOR IMMUNIZATION: ICD-10-CM

## 2021-11-02 DIAGNOSIS — S89.91XA UNSPECIFIED INJURY OF RIGHT LOWER LEG, INITIAL ENCOUNTER: ICD-10-CM

## 2021-11-02 DIAGNOSIS — M25.561 PAIN IN RIGHT KNEE: ICD-10-CM

## 2021-11-02 DIAGNOSIS — L98.9 DISORDER OF THE SKIN AND SUBCUTANEOUS TISSUE, UNSPECIFIED: ICD-10-CM

## 2021-11-02 DIAGNOSIS — M79.672 PAIN IN LEFT FOOT: ICD-10-CM

## 2021-11-02 PROCEDURE — 99396 PREV VISIT EST AGE 40-64: CPT | Mod: 25,GC

## 2021-11-02 PROCEDURE — 90686 IIV4 VACC NO PRSV 0.5 ML IM: CPT

## 2021-11-02 PROCEDURE — G0008: CPT

## 2021-11-04 ENCOUNTER — OUTPATIENT (OUTPATIENT)
Dept: OUTPATIENT SERVICES | Facility: HOSPITAL | Age: 51
LOS: 1 days | End: 2021-11-04
Payer: COMMERCIAL

## 2021-11-04 ENCOUNTER — RESULT REVIEW (OUTPATIENT)
Age: 51
End: 2021-11-04

## 2021-11-04 PROCEDURE — 73560 X-RAY EXAM OF KNEE 1 OR 2: CPT

## 2021-11-04 PROCEDURE — 73560 X-RAY EXAM OF KNEE 1 OR 2: CPT | Mod: 26,RT

## 2021-11-12 ENCOUNTER — TRANSCRIPTION ENCOUNTER (OUTPATIENT)
Age: 51
End: 2021-11-12

## 2021-11-12 LAB
CHOLEST SERPL-MCNC: 292 MG/DL
HDLC SERPL-MCNC: 76 MG/DL
HEMOCCULT STL QL IA: NEGATIVE
HIV1+2 AB SPEC QL IA.RAPID: NONREACTIVE
LDLC SERPL CALC-MCNC: 191 MG/DL
NONHDLC SERPL-MCNC: 216 MG/DL
TRIGL SERPL-MCNC: 123 MG/DL

## 2022-08-18 ENCOUNTER — APPOINTMENT (OUTPATIENT)
Dept: INTERNAL MEDICINE | Facility: CLINIC | Age: 52
End: 2022-08-18

## 2022-08-18 VITALS
OXYGEN SATURATION: 100 % | HEIGHT: 67 IN | DIASTOLIC BLOOD PRESSURE: 86 MMHG | RESPIRATION RATE: 12 BRPM | TEMPERATURE: 98 F | WEIGHT: 151 LBS | BODY MASS INDEX: 23.7 KG/M2 | SYSTOLIC BLOOD PRESSURE: 124 MMHG | HEART RATE: 80 BPM

## 2022-08-18 DIAGNOSIS — M25.562 PAIN IN LEFT KNEE: ICD-10-CM

## 2022-08-18 PROCEDURE — 99214 OFFICE O/P EST MOD 30 MIN: CPT | Mod: GC

## 2022-08-19 NOTE — HISTORY OF PRESENT ILLNESS
[FreeTextEntry8] : 53yo M w/ a PMHx of GERD, vertigo, and plantar fascitis who presents with left knee pain. Patient presenting w/ left knee pain x2 months. Patient states he was playing tennis and felt a "pop" in his left knee and initially noticed swelling that has since resolved. Pain is located below patella and radiates laterally. States pain is worse when he is playing tennis and stops to change direction. No pain to palpation, swelling, ecchymosis, or warmth. Patient states he plays tennis 2-3x/wk for 2h at a time. He was seen previously for similar right knee pain in November 2021 where he was given a referral to PM&R but patient states he never went. Denies fevers, headaches, chest pain, sob, abdominal pain, n/v/d.

## 2022-08-19 NOTE — PHYSICAL EXAM
[No Acute Distress] : no acute distress [Well Nourished] : well nourished [Well Developed] : well developed [Well-Appearing] : well-appearing [Normal Sclera/Conjunctiva] : normal sclera/conjunctiva [PERRL] : pupils equal round and reactive to light [EOMI] : extraocular movements intact [Normal Outer Ear/Nose] : the outer ears and nose were normal in appearance [Normal Oropharynx] : the oropharynx was normal [No JVD] : no jugular venous distention [No Lymphadenopathy] : no lymphadenopathy [Supple] : supple [Thyroid Normal, No Nodules] : the thyroid was normal and there were no nodules present [No Respiratory Distress] : no respiratory distress  [No Accessory Muscle Use] : no accessory muscle use [Clear to Auscultation] : lungs were clear to auscultation bilaterally [Normal Rate] : normal rate  [Regular Rhythm] : with a regular rhythm [Normal S1, S2] : normal S1 and S2 [No Murmur] : no murmur heard [No Carotid Bruits] : no carotid bruits [No Abdominal Bruit] : a ~M bruit was not heard ~T in the abdomen [No Varicosities] : no varicosities [Pedal Pulses Present] : the pedal pulses are present [No Edema] : there was no peripheral edema [No Palpable Aorta] : no palpable aorta [No Extremity Clubbing/Cyanosis] : no extremity clubbing/cyanosis [Soft] : abdomen soft [Non Tender] : non-tender [Non-distended] : non-distended [No Masses] : no abdominal mass palpated [No HSM] : no HSM [Normal Bowel Sounds] : normal bowel sounds [Normal Posterior Cervical Nodes] : no posterior cervical lymphadenopathy [Normal Anterior Cervical Nodes] : no anterior cervical lymphadenopathy [No CVA Tenderness] : no CVA  tenderness [No Spinal Tenderness] : no spinal tenderness [No Joint Swelling] : no joint swelling [Grossly Normal Strength/Tone] : grossly normal strength/tone [No Rash] : no rash [Coordination Grossly Intact] : coordination grossly intact [No Focal Deficits] : no focal deficits [Normal Gait] : normal gait [Deep Tendon Reflexes (DTR)] : deep tendon reflexes were 2+ and symmetric [Normal Affect] : the affect was normal [Normal Insight/Judgement] : insight and judgment were intact [de-identified] : Left knee pain w/ varus stress. No ecchymosis, swelling, warmth.

## 2022-08-19 NOTE — END OF VISIT
[] : Resident [FreeTextEntry3] : Here with Left knee pain \par Similar to R knee pain from last year \par Exam- full ROM, can bear weight, strength intact \par Likely ligament/soft tissue injury\par PT referral , has ortho appt already \par Rec refarining from playing tennis for next 1-2 weeks while resting and undergoing eval \par

## 2022-08-19 NOTE — REVIEW OF SYSTEMS
[Joint Pain] : joint pain [Negative] : Heme/Lymph [Joint Stiffness] : no joint stiffness [Muscle Pain] : no muscle pain [Muscle Weakness] : no muscle weakness [Back Pain] : no back pain [Joint Swelling] : no joint swelling [FreeTextEntry9] : Left knee pain

## 2022-08-19 NOTE — ASSESSMENT
[FreeTextEntry1] : 53yo M w/ a PMHx of GERD, vertigo, and plantar fascitis who presents with left knee pain. \par \par #Left Knee Pain\par Patient presenting w/ Left knee pain x2 months. Patient states he was playing tennis and felt a "pop" in his left knee. Initial swelling but has since gone down. Pain located below patella and radiates laterally. States pain is worse when he is playing tennis and stops to change direction. No pain to palpation, swelling, ecchymosis, or warmth.  Patient states he plays tennis 2-3x/wk for 2h at a time. He was seen previously for similar right knee pain in November 2021 where he was given a referral to PM&R but patient states he never went. Possibly 2/2 strain vs sprain.\par - ibuprofen x5-7d, capsaicin cream\par - gave referral for PT\par - patient states he has appointment w/ ortho\par - advised patient to refrain from playing tennis for the next 2 weeks and to ice/elevate knee. \par \par \par *RTC in November for CPE

## 2022-08-30 ENCOUNTER — APPOINTMENT (OUTPATIENT)
Dept: INTERNAL MEDICINE | Facility: CLINIC | Age: 52
End: 2022-08-30

## 2022-09-13 ENCOUNTER — APPOINTMENT (OUTPATIENT)
Dept: ORTHOPEDIC SURGERY | Facility: CLINIC | Age: 52
End: 2022-09-13

## 2022-09-13 ENCOUNTER — RESULT REVIEW (OUTPATIENT)
Age: 52
End: 2022-09-13

## 2022-09-13 ENCOUNTER — OUTPATIENT (OUTPATIENT)
Dept: OUTPATIENT SERVICES | Facility: HOSPITAL | Age: 52
LOS: 1 days | End: 2022-09-13
Payer: COMMERCIAL

## 2022-09-13 VITALS
HEIGHT: 67 IN | SYSTOLIC BLOOD PRESSURE: 139 MMHG | OXYGEN SATURATION: 98 % | DIASTOLIC BLOOD PRESSURE: 80 MMHG | WEIGHT: 151 LBS | HEART RATE: 71 BPM | BODY MASS INDEX: 23.7 KG/M2

## 2022-09-13 PROCEDURE — 99204 OFFICE O/P NEW MOD 45 MIN: CPT

## 2022-09-13 PROCEDURE — 72170 X-RAY EXAM OF PELVIS: CPT | Mod: 26

## 2022-09-13 PROCEDURE — 73564 X-RAY EXAM KNEE 4 OR MORE: CPT | Mod: 26,LT,RT

## 2022-09-13 PROCEDURE — 73564 X-RAY EXAM KNEE 4 OR MORE: CPT

## 2022-09-13 PROCEDURE — 72170 X-RAY EXAM OF PELVIS: CPT

## 2022-09-13 RX ORDER — MELOXICAM 7.5 MG/1
7.5 TABLET ORAL DAILY
Qty: 30 | Refills: 2 | Status: ACTIVE | COMMUNITY
Start: 2022-09-13 | End: 1900-01-01

## 2022-09-13 NOTE — HISTORY OF PRESENT ILLNESS
[5] : a current pain level of 5/10 [Constant] : ~He/She~ states the symptoms seem to be constant [Walking] : worsened by walking [Rest] : relieved by rest [de-identified] : 9/13/22: 53 y/o male presenting for bilateral knee pain L>R for the past 8 weeks. He reports playing a tennis match and states the following day he experienced bilateral knee swelling with primarily left anterior knee pain. Denies fall or injury. He localizes the pain to the left knee anterolateral joint line and fat pad.  Treatment thus far include Naproxen and 1 week of PT. Modifying factors include bending of the knee. [de-identified] : patient states pain is sharp

## 2022-09-13 NOTE — DISCUSSION/SUMMARY
[de-identified] : 51 y/o male with bilateral knee patellofemoral osteoarthritis and patellar subluxation\par - PT \par - HEP\par - Tylenol + Meloxicam as needed for pain\par - RICE\par - Followup in 2 months, no new XRs needed. Consider MRI +/- referral to Sports for patellar realignment if symptoms fail to respond to conservative measures

## 2022-09-13 NOTE — PHYSICAL EXAM
[de-identified] : General appearance: well nourished and hydrated, pleasant, alert and oriented x 3, cooperative.  \par HEENT: normocephalic, EOM intact, wearing mask, external auditory canal clear.  \par Cardiovascular: no lower leg edema, no varicosities, dorsalis pedis pulses palpable and symmetric.  \par Lymphatics: no palpable lymphadenopathy, no lymphedema.  \par Neurologic: sensation is normal, no muscle weakness in upper or lower extremities, patella tendon reflexes present and symmetric.  \par Dermatologic: skin moist, warm, no rash.  \par Spine: cervical spine with normal lordosis and painless range of motion, thoracic spine with normal kyphosis and painless range of motion, lumbosacral spine with normal lordosis and painless range of motion.  No tenderness to palpation along midline spine and paraspinal musculature.  Sacroiliac joints nontender bilaterally. Negative SLR and crossed SLR tests bilaterally.\par Gait: normal.  \par \par Left knee: all fields negative/normal/unremarkable unless otherwise noted -- \par - Focal soft tissue swelling: \par - Ecchymosis: \par - Erythema: \par - Effusion: none\par - Wounds: \par - Alignment: normal\par - Tenderness: mild medial joint line /  mild lateral joint line/ peripatellar, patella compression TTP\par - ROM: 0-140\par - Collateral laxity: stable\par - Cruciate laxity:  stable\par - Meniscal signs: negative Freddie and Alejandro.  \par - Popliteal angle (degrees): 60\par - Quad strength: \par \par Right knee: all fields negative/normal/unremarkable unless otherwise noted --\par - Focal soft tissue swelling: \par - Ecchymosis: \par - Erythema: \par - Effusion: none\par - Wounds: \par - Alignment: normal\par - Tenderness: peripatellar / patella compression TTP\par - ROM: 0-140\par - Collateral laxity:  stable\par - Cruciate laxity:  stable\par - Meniscal signs: negative Freddie and Alejandro.  \par - Popliteal angle (degrees):  60\par - Quad strength:  [de-identified] : AP pelvis and 4 views of the bilateral knees (weightbearing AP, weightbearing Kong, weightbearing lateral, and Sunrise) were interpreted by me and reviewed with the patient.\par \par Location of imaging: [facility]\par Date of exam: 9/13/22\par \par Pelvic alignment: normal\par \par Right hip -- \par Arthritis: none\par Deformity: cam\par Osteonecrosis: none \par \par Left hip -- \par Arthritis: none\par Deformity: cam\par Osteonecrosis: none\par \par Right knee -- \par Alignment: femoral anteversion\par Arthritis: tricompartmental worst patellofemoral, KL1-2\par Patellar height: fly\par Patellar tracking: marked lateral tilt and subluxation\par \par Left knee -- \par Alignment: femoral anteversion\par Arthritis: patellofemoral, KL1\par Patellar height: fly\par Patellar tracking: lateral tilt and subluxation

## 2022-10-25 ENCOUNTER — APPOINTMENT (OUTPATIENT)
Dept: ORTHOPEDIC SURGERY | Facility: CLINIC | Age: 52
End: 2022-10-25

## 2022-10-25 VITALS — HEART RATE: 82 BPM | DIASTOLIC BLOOD PRESSURE: 80 MMHG | SYSTOLIC BLOOD PRESSURE: 130 MMHG | OXYGEN SATURATION: 99 %

## 2022-10-25 PROCEDURE — 99213 OFFICE O/P EST LOW 20 MIN: CPT

## 2022-10-25 NOTE — DISCUSSION/SUMMARY
[de-identified] : 51 y/o male with b/l knee patellofemoral OA and patellar subluxation, symptomatically improved with conservative management. \par - We will continue with conservative options including PT, home exercise program. He will use the Tylenol and meloxicam as needed. \par - While I don't think he needs to consider surgical management at this time, he did express willingness to meet a sports medicine specialist to establish a relationship in case symptoms worsen. Referral was given\par - He will f/u with me as needed for new or worsening symptoms.

## 2022-10-25 NOTE — END OF VISIT
[FreeTextEntry3] : All medical record entries made by the Scribe were at my, Dr. Marcel Saul, direction and personally dictated by me on 10/25/2022. I have reviewed the chart and agree that the record accurately reflects my personal performance of the history, physical exam, assessment and plan. I have also personally directed, reviewed, and agreed with the chart.

## 2022-10-25 NOTE — HISTORY OF PRESENT ILLNESS
[de-identified] : 10/25/2022: 53 y/o male f/u for b/l knee patellofemoral OA and patellar lateral subluxation. He reports that he is about 12 sessions into his PT program and notes improvement. He notes subjective relief of pain from 5/10 at his previous visit to 2/10 this visit. He only tried 1 or 2 doses of the meloxicam but overall feels that it is not necessary and does not take it or any other analgesics now. \par \par 9/13/22: 53 y/o male presenting for bilateral knee pain L>R for the past 8 weeks. He reports playing a tennis match and states the following day he experienced bilateral knee swelling with primarily left anterior knee pain. Denies fall or injury. He localizes the pain to the left knee anterolateral joint line and fat pad.  Treatment thus far include Naproxen and 1 week of PT. Modifying factors include bending of the knee.

## 2022-10-25 NOTE — PHYSICAL EXAM
[de-identified] : General appearance: well nourished and hydrated, pleasant, alert and oriented x 3, cooperative.  \par HEENT: normocephalic, EOM intact, wearing mask, external auditory canal clear.  \par Cardiovascular: no lower leg edema, no varicosities, dorsalis pedis pulses palpable and symmetric.  \par Lymphatics: no palpable lymphadenopathy, no lymphedema.  \par Neurologic: sensation is normal, no muscle weakness in upper or lower extremities, patella tendon reflexes present and symmetric.  \par Dermatologic: skin moist, warm, no rash.  \par Spine: cervical spine with normal lordosis and painless range of motion, thoracic spine with normal kyphosis and painless range of motion, lumbosacral spine with normal lordosis and painless range of motion.  No tenderness to palpation along midline spine and paraspinal musculature.  Sacroiliac joints nontender bilaterally. Negative SLR and crossed SLR tests bilaterally.\par Gait: normal.  \par \par Left knee: all fields negative/normal/unremarkable unless otherwise noted -- \par - Focal soft tissue swelling: \par - Ecchymosis: \par - Erythema: \par - Effusion: trace, no Baker's cyst\par - Wounds: \par - Alignment: normal\par - Tenderness: lateral patellar facet pain and pain with patellar compression\par - ROM: 0-140, anterolateral fat pad pain with maximal flexion\par - Collateral laxity: stable\par - Cruciate laxity:  stable\par - Meniscal signs: negative Freddie and Alejandro.  \par - Popliteal angle (degrees): 50\par - Quad strength: 5/5\par \par Right knee: all fields negative/normal/unremarkable unless otherwise noted --\par - Focal soft tissue swelling: \par - Ecchymosis: \par - Erythema: \par - Effusion: none, no Baker's cyst\par - Wounds: \par - Alignment: normal\par - Tenderness: lateral patellar facet pain with palpation, no pain with patellar compression\par - ROM: 0-140, very mild anterolateral pain with terminal flexion \par - Collateral laxity:  stable\par - Cruciate laxity:  stable\par - Meniscal signs: negative Freddie and Alejandro.  \par - Popliteal angle (degrees):  50\par - Quad strength: 5/5

## 2022-10-25 NOTE — ADDENDUM
[FreeTextEntry1] : Documented by Slime Marroquin acting as a scribe for Dr. Marcel Saul on 10/25/2022.

## 2022-11-03 ENCOUNTER — NON-APPOINTMENT (OUTPATIENT)
Age: 52
End: 2022-11-03

## 2022-11-23 ENCOUNTER — APPOINTMENT (OUTPATIENT)
Dept: ORTHOPEDIC SURGERY | Facility: CLINIC | Age: 52
End: 2022-11-23

## 2022-11-23 VITALS
HEART RATE: 87 BPM | BODY MASS INDEX: 23.7 KG/M2 | DIASTOLIC BLOOD PRESSURE: 85 MMHG | OXYGEN SATURATION: 98 % | HEIGHT: 67 IN | WEIGHT: 151 LBS | SYSTOLIC BLOOD PRESSURE: 128 MMHG

## 2022-11-23 DIAGNOSIS — M17.0 BILATERAL PRIMARY OSTEOARTHRITIS OF KNEE: ICD-10-CM

## 2022-11-23 PROCEDURE — 99214 OFFICE O/P EST MOD 30 MIN: CPT

## 2022-11-23 NOTE — HISTORY OF PRESENT ILLNESS
[de-identified] : 11/23/2022: 53 y/o male f/u for b/l patellofemoral subluxation associated with mild OA. He notes that he is just about done with PT at this time, which he has been attending faithfully 2x week, however he went back to playing tennis and about 3 weeks ago he states he had an incident in which he feels he "wrenched" something in the left knee out of place on the court. He reports sharply increased pain and swelling at that time. He was able to walk off the court and in the intervening time he has noted some improvement in his symptoms but would like further evaluation to rule out some sort of new internal derangement of the left knee. The right knee was unaffected and remains relatively benign as far as symptoms. He has been solely using cryotherapy for pain, no systemic analgesics or anti-inflammatories. He notes that the location of the pain is consistent with the location of prior episodes, predominantly at the anterior and anterolateral peripatellar region. \par \par 10/25/2022: 53 y/o male f/u for b/l knee patellofemoral OA and patellar lateral subluxation. He reports that he is about 12 sessions into his PT program and notes improvement. He notes subjective relief of pain from 5/10 at his previous visit to 2/10 this visit. He only tried 1 or 2 doses of the meloxicam but overall feels that it is not necessary and does not take it or any other analgesics now. \par \par 9/13/22: 53 y/o male presenting for bilateral knee pain L>R for the past 8 weeks. He reports playing a tennis match and states the following day he experienced bilateral knee swelling with primarily left anterior knee pain. Denies fall or injury. He localizes the pain to the left knee anterolateral joint line and fat pad.  Treatment thus far include Naproxen and 1 week of PT. Modifying factors include bending of the knee.

## 2022-11-23 NOTE — END OF VISIT
[FreeTextEntry3] : All medical record entries made by the Scribe were at my, Dr. Marcel Saul, direction and personally dictated by me on 11/23/2022. I have reviewed the chart and agree that the record accurately reflects my personal performance of the history, physical exam, assessment and plan. I have also personally directed, reviewed, and agreed with the chart.

## 2022-11-23 NOTE — PHYSICAL EXAM
[de-identified] : General appearance: well nourished and hydrated, pleasant, alert and oriented x 3, cooperative.  \par HEENT: normocephalic, EOM intact, wearing mask, external auditory canal clear.  \par Cardiovascular: no lower leg edema, no varicosities, dorsalis pedis pulses palpable and symmetric.  \par Lymphatics: no palpable lymphadenopathy, no lymphedema.  \par Neurologic: sensation is normal, no muscle weakness in upper or lower extremities, patella tendon reflexes present and symmetric.  \par Dermatologic: skin moist, warm, no rash.  \par Spine: cervical spine with normal lordosis and painless range of motion, thoracic spine with normal kyphosis and painless range of motion, lumbosacral spine with normal lordosis and painless range of motion.  No tenderness to palpation along midline spine and paraspinal musculature.  Sacroiliac joints nontender bilaterally. Negative SLR and crossed SLR tests bilaterally.\par Gait: normal.  \par \par Left knee: all fields negative/normal/unremarkable unless otherwise noted -- \par - Focal soft tissue swelling: \par - Ecchymosis: \par - Erythema: \par - Effusion: small, no Baker's cyst\par - Clinically high riding patella\par - Wounds: \par - Alignment: normal\par - Tenderness: lateral peripatellar pain \par - ROM: 0-140, no pain at extremes of motion\par - Collateral laxity: stable\par - Cruciate laxity:  stable\par - Meniscal signs: negative Freddie and Alejandro.  \par - Popliteal angle (degrees): 45\par - Quad strength: 5/5

## 2022-11-23 NOTE — ADDENDUM
[FreeTextEntry1] : Documented by Slime Marroquin acting as a scribe for Dr. Marcel Saul on 11/23/2022.

## 2022-11-23 NOTE — DISCUSSION/SUMMARY
[de-identified] : 53 y/o male with recurrent symptoms likely attributable to patellofemoral subluxation, radiographically worse on the right but symptomatically worse on the left, and mild OA. \par - At this point, I do think that it is reasonable to pursue MRI of the bilateral knees to r/o meniscus injury or osteochondral injury, as well as to assess TT-TG distances.  My impression remains that these most recent complaints are in the same spectrum of severe patellofemoral disease. \par - I will call him back once the MRI results have been finalized with the results and further recommendations. Likely for referral to Sports to discuss patellar realignment. Provider names were again given today.

## 2022-11-30 ENCOUNTER — OUTPATIENT (OUTPATIENT)
Dept: OUTPATIENT SERVICES | Facility: HOSPITAL | Age: 52
LOS: 1 days | End: 2022-11-30
Payer: COMMERCIAL

## 2022-11-30 ENCOUNTER — RESULT REVIEW (OUTPATIENT)
Age: 52
End: 2022-11-30

## 2022-11-30 ENCOUNTER — APPOINTMENT (OUTPATIENT)
Dept: MRI IMAGING | Facility: HOSPITAL | Age: 52
End: 2022-11-30

## 2022-11-30 PROCEDURE — 73721 MRI JNT OF LWR EXTRE W/O DYE: CPT

## 2022-11-30 PROCEDURE — 73721 MRI JNT OF LWR EXTRE W/O DYE: CPT | Mod: 26,RT

## 2022-12-01 ENCOUNTER — NON-APPOINTMENT (OUTPATIENT)
Age: 52
End: 2022-12-01

## 2024-07-01 ENCOUNTER — NON-APPOINTMENT (OUTPATIENT)
Age: 54
End: 2024-07-01

## 2024-07-01 ENCOUNTER — APPOINTMENT (OUTPATIENT)
Dept: INTERNAL MEDICINE | Facility: CLINIC | Age: 54
End: 2024-07-01

## 2024-07-01 VITALS
BODY MASS INDEX: 23.7 KG/M2 | HEIGHT: 67 IN | HEART RATE: 69 BPM | WEIGHT: 151 LBS | DIASTOLIC BLOOD PRESSURE: 82 MMHG | TEMPERATURE: 97 F | SYSTOLIC BLOOD PRESSURE: 123 MMHG | OXYGEN SATURATION: 97 %

## 2024-07-01 DIAGNOSIS — Z23 ENCOUNTER FOR IMMUNIZATION: ICD-10-CM

## 2024-07-01 DIAGNOSIS — E78.00 PURE HYPERCHOLESTEROLEMIA, UNSPECIFIED: ICD-10-CM

## 2024-07-01 DIAGNOSIS — Z12.11 ENCOUNTER FOR SCREENING FOR MALIGNANT NEOPLASM OF COLON: ICD-10-CM

## 2024-07-01 PROCEDURE — 90471 IMMUNIZATION ADMIN: CPT

## 2024-07-01 PROCEDURE — 90739 HEPB VACC 2/4 DOSE ADULT IM: CPT

## 2024-07-01 PROCEDURE — 90632 HEPA VACCINE ADULT IM: CPT

## 2024-07-01 PROCEDURE — 99396 PREV VISIT EST AGE 40-64: CPT | Mod: 25,GC

## 2024-07-02 LAB
ALBUMIN SERPL ELPH-MCNC: 4.7 G/DL
ALP BLD-CCNC: 63 U/L
ALT SERPL-CCNC: 18 U/L
ANION GAP SERPL CALC-SCNC: 12 MMOL/L
AST SERPL-CCNC: 23 U/L
BASOPHILS # BLD AUTO: 0.07 K/UL
BASOPHILS NFR BLD AUTO: 1 %
BILIRUB SERPL-MCNC: 0.9 MG/DL
BUN SERPL-MCNC: 12 MG/DL
CALCIUM SERPL-MCNC: 9.3 MG/DL
CHLORIDE SERPL-SCNC: 102 MMOL/L
CHOLEST SERPL-MCNC: 273 MG/DL
CO2 SERPL-SCNC: 27 MMOL/L
CREAT SERPL-MCNC: 0.95 MG/DL
EGFR: 96 ML/MIN/1.73M2
EOSINOPHIL # BLD AUTO: 0.08 K/UL
EOSINOPHIL NFR BLD AUTO: 1.2 %
GLUCOSE SERPL-MCNC: 98 MG/DL
HCT VFR BLD CALC: 48.8 %
HDLC SERPL-MCNC: 74 MG/DL
HGB BLD-MCNC: 16.1 G/DL
IMM GRANULOCYTES NFR BLD AUTO: 0.3 %
LDLC SERPL CALC-MCNC: 177 MG/DL
LYMPHOCYTES # BLD AUTO: 1.36 K/UL
LYMPHOCYTES NFR BLD AUTO: 20.1 %
MAN DIFF?: NORMAL
MCHC RBC-ENTMCNC: 29.4 PG
MCHC RBC-ENTMCNC: 33 GM/DL
MCV RBC AUTO: 89.2 FL
MEV IGG FLD QL IA: 6.41 AU/ML
MEV IGG+IGM SER-IMP: NEGATIVE
MONOCYTES # BLD AUTO: 0.37 K/UL
MONOCYTES NFR BLD AUTO: 5.5 %
MUV AB SER-ACNC: POSITIVE
MUV IGG SER QL IA: 12.8 AU/ML
NEUTROPHILS # BLD AUTO: 4.88 K/UL
NEUTROPHILS NFR BLD AUTO: 71.9 %
NONHDLC SERPL-MCNC: 199 MG/DL
PLATELET # BLD AUTO: 306 K/UL
POTASSIUM SERPL-SCNC: 5.1 MMOL/L
PROT SERPL-MCNC: 7.4 G/DL
RBC # BLD: 5.47 M/UL
RBC # FLD: 13.8 %
RUBV IGG FLD-ACNC: 0.35 INDEX
RUBV IGG SER-IMP: NEGATIVE
SODIUM SERPL-SCNC: 141 MMOL/L
TRIGL SERPL-MCNC: 125 MG/DL
WBC # FLD AUTO: 6.78 K/UL

## 2024-07-03 DIAGNOSIS — Z71.84 ENC FOR HEALTH COUNSELING RELATED TO TRAVEL: ICD-10-CM

## 2024-07-03 RX ORDER — ATOVAQUONE AND PROGUANIL HYDROCHLORIDE 250; 100 MG/1; MG/1
250-100 TABLET, FILM COATED ORAL DAILY
Qty: 21 | Refills: 0 | Status: ACTIVE | COMMUNITY
Start: 2024-07-03 | End: 1900-01-01

## 2024-07-03 RX ORDER — SALMONELLA TYPHI TY21A 6000000000 [CFU]/1
CAPSULE, COATED ORAL
Qty: 1 | Refills: 0 | Status: ACTIVE | COMMUNITY
Start: 2024-07-03 | End: 1900-01-01

## 2024-08-05 ENCOUNTER — APPOINTMENT (OUTPATIENT)
Dept: INTERNAL MEDICINE | Facility: CLINIC | Age: 54
End: 2024-08-05

## 2024-08-05 PROBLEM — Z23 NEED FOR HEPATITIS B VACCINATION: Status: ACTIVE | Noted: 2024-08-05

## 2024-08-05 PROCEDURE — 90471 IMMUNIZATION ADMIN: CPT

## 2024-08-05 PROCEDURE — 90739 HEPB VACC 2/4 DOSE ADULT IM: CPT

## 2024-12-25 PROBLEM — F10.90 ALCOHOL USE: Status: ACTIVE | Noted: 2017-05-02

## 2025-02-10 ENCOUNTER — NON-APPOINTMENT (OUTPATIENT)
Age: 55
End: 2025-02-10

## 2025-02-10 ENCOUNTER — APPOINTMENT (OUTPATIENT)
Dept: INTERNAL MEDICINE | Facility: CLINIC | Age: 55
End: 2025-02-10
Payer: COMMERCIAL

## 2025-02-10 ENCOUNTER — APPOINTMENT (OUTPATIENT)
Dept: GASTROENTEROLOGY | Facility: CLINIC | Age: 55
End: 2025-02-10
Payer: COMMERCIAL

## 2025-02-10 ENCOUNTER — MED ADMIN CHARGE (OUTPATIENT)
Age: 55
End: 2025-02-10

## 2025-02-10 VITALS
DIASTOLIC BLOOD PRESSURE: 84 MMHG | HEART RATE: 90 BPM | HEIGHT: 67 IN | OXYGEN SATURATION: 100 % | TEMPERATURE: 97.7 F | BODY MASS INDEX: 24.33 KG/M2 | RESPIRATION RATE: 18 BRPM | SYSTOLIC BLOOD PRESSURE: 126 MMHG | WEIGHT: 155 LBS

## 2025-02-10 DIAGNOSIS — Z23 ENCOUNTER FOR IMMUNIZATION: ICD-10-CM

## 2025-02-10 DIAGNOSIS — Z12.11 ENCOUNTER FOR SCREENING FOR MALIGNANT NEOPLASM OF COLON: ICD-10-CM

## 2025-02-10 PROCEDURE — 90632 HEPA VACCINE ADULT IM: CPT

## 2025-02-10 PROCEDURE — 90471 IMMUNIZATION ADMIN: CPT

## 2025-02-10 PROCEDURE — 90739 HEPB VACC 2/4 DOSE ADULT IM: CPT

## 2025-02-10 PROCEDURE — 99204 OFFICE O/P NEW MOD 45 MIN: CPT

## 2025-02-10 PROCEDURE — 90472 IMMUNIZATION ADMIN EACH ADD: CPT

## 2025-02-10 RX ORDER — SODIUM SULFATE, POTASSIUM SULFATE AND MAGNESIUM SULFATE 1.6; 3.13; 17.5 G/177ML; G/177ML; G/177ML
17.5-3.13-1.6 SOLUTION ORAL
Qty: 1 | Refills: 0 | Status: ACTIVE | COMMUNITY
Start: 2025-02-10 | End: 1900-01-01

## 2025-03-27 ENCOUNTER — APPOINTMENT (OUTPATIENT)
Dept: GASTROENTEROLOGY | Facility: HOSPITAL | Age: 55
End: 2025-03-27

## 2025-03-27 ENCOUNTER — OUTPATIENT (OUTPATIENT)
Dept: OUTPATIENT SERVICES | Facility: HOSPITAL | Age: 55
LOS: 1 days | Discharge: HOME | End: 2025-03-27
Payer: COMMERCIAL

## 2025-03-27 ENCOUNTER — RESULT REVIEW (OUTPATIENT)
Age: 55
End: 2025-03-27

## 2025-03-27 VITALS
DIASTOLIC BLOOD PRESSURE: 85 MMHG | RESPIRATION RATE: 18 BRPM | HEART RATE: 95 BPM | SYSTOLIC BLOOD PRESSURE: 140 MMHG | TEMPERATURE: 98 F | HEIGHT: 67 IN | OXYGEN SATURATION: 99 % | WEIGHT: 154.98 LBS

## 2025-03-27 PROCEDURE — 45380 COLONOSCOPY AND BIOPSY: CPT | Mod: XS,PT

## 2025-03-27 PROCEDURE — 88305 TISSUE EXAM BY PATHOLOGIST: CPT | Mod: 26

## 2025-03-27 PROCEDURE — 88305 TISSUE EXAM BY PATHOLOGIST: CPT

## 2025-03-27 PROCEDURE — 45385 COLONOSCOPY W/LESION REMOVAL: CPT | Mod: PT

## 2025-03-27 PROCEDURE — 45385 COLONOSCOPY W/LESION REMOVAL: CPT

## 2025-03-27 PROCEDURE — 45380 COLONOSCOPY AND BIOPSY: CPT | Mod: 59

## 2025-03-27 NOTE — PRE-ANESTHESIA EVALUATION ADULT - NSANTHOSAYNRD_GEN_A_CORE
No. GENET screening performed.  STOP BANG Legend: 0-2 = LOW Risk; 3-4 = INTERMEDIATE Risk; 5-8 = HIGH Risk

## 2025-03-27 NOTE — PRE-ANESTHESIA EVALUATION ADULT - NSANTHPMHFT_GEN_ALL_CORE
54 year old male with no relevant PMH presenting for colorectal cancer screening via colonoscopy.    Cardiac: Denies HTN, HLD, MI/Angina/Heart Failure, Arrhythmia, Murmur/Valvular Disorder. >4 METS  HEENT: Positive for Temporomandibular dislocation.  Pulmonary: Denies Asthma, COPD, GENET  Renal: Denies kidney dysfunction  Hepatic: Denies liver dysfunction  Gastrointestinal: Denies GERD/IBD.  Endocrine: Denies DM or thyroid dysfunction.  Neurologic: Denies stroke/seizure disorder  Hematologic: Denies anemia, blood clotting disorder, blood thinning medication.    PSH: Non-contributory.

## 2025-03-27 NOTE — PRE-ANESTHESIA EVALUATION ADULT - NSDENTALSD_ENT_ALL_CORE
Missing rear molars, premolars, teeth. Poor dentition with several chipped teeth, ground/worn teeth, cavities./loose teeth/missing teeth

## 2025-04-07 ENCOUNTER — TRANSCRIPTION ENCOUNTER (OUTPATIENT)
Age: 55
End: 2025-04-07

## 2025-04-29 DIAGNOSIS — R21 RASH AND OTHER NONSPECIFIC SKIN ERUPTION: ICD-10-CM

## (undated) DEVICE — POLY TRAP ETRAP

## (undated) DEVICE — FORCEP RADIAL JAW 4 JUMBO 2.8MM 3.2MM 240CM ORANGE DISP

## (undated) DEVICE — SNARE CAPTIVATOR II RND COLD 10MM